# Patient Record
Sex: MALE | Race: WHITE | URBAN - METROPOLITAN AREA
[De-identification: names, ages, dates, MRNs, and addresses within clinical notes are randomized per-mention and may not be internally consistent; named-entity substitution may affect disease eponyms.]

---

## 2021-08-25 ENCOUNTER — OFFICE VISIT (OUTPATIENT)
Dept: URBAN - METROPOLITAN AREA CLINIC 10 | Facility: CLINIC | Age: 66
End: 2021-08-25
Payer: COMMERCIAL

## 2021-08-25 DIAGNOSIS — H17.12 CENTRAL CORNEAL OPACITY, LEFT EYE: Primary | ICD-10-CM

## 2021-08-25 DIAGNOSIS — H35.373 PUCKERING OF MACULA, BILATERAL: ICD-10-CM

## 2021-08-25 PROCEDURE — 92132 CPTRZD OPH DX IMG ANT SGM: CPT | Performed by: OPHTHALMOLOGY

## 2021-08-25 PROCEDURE — 92134 CPTRZ OPH DX IMG PST SGM RTA: CPT | Performed by: OPHTHALMOLOGY

## 2021-08-25 PROCEDURE — 92025 CPTRIZED CORNEAL TOPOGRAPHY: CPT | Performed by: OPHTHALMOLOGY

## 2021-08-25 PROCEDURE — 99204 OFFICE O/P NEW MOD 45 MIN: CPT | Performed by: OPHTHALMOLOGY

## 2021-08-25 ASSESSMENT — INTRAOCULAR PRESSURE
OD: 15
OS: 12

## 2021-08-25 NOTE — IMPRESSION/PLAN
Impression: Central corneal opacity, left eye: H17.12. Plan: H/o corneal ulcer OS with subsequent scar. Was seen at [facility] 17 consultants, states that he had anterior keratectomy OS with no improvement. Here to consider other options. Discussed specialty CTLs OS, pt declines. Discussed PTK - explained that this may not eliminate the scar in its entirety; he is also at risk of hyperopic shift with treatment, and may still need specialty CTL for his nest vision. If he desires to pursue this option, may refer to Dr. Guillen Cheney or outside corneal specialist for consideration. Pt inquires about keratoplasty, discussed R/B/A/Cs including prolonged healing time, may still need CTL for best vision, may be too aggressive; pt understands and declines to proceed.